# Patient Record
Sex: MALE | Race: WHITE | URBAN - METROPOLITAN AREA
[De-identification: names, ages, dates, MRNs, and addresses within clinical notes are randomized per-mention and may not be internally consistent; named-entity substitution may affect disease eponyms.]

---

## 2020-01-02 ENCOUNTER — OFFICE VISIT (OUTPATIENT)
Dept: URBAN - METROPOLITAN AREA CLINIC 25 | Facility: CLINIC | Age: 25
End: 2020-01-02
Payer: COMMERCIAL

## 2020-01-02 DIAGNOSIS — H52.13 MYOPIA, BILATERAL: Primary | ICD-10-CM

## 2020-01-02 PROCEDURE — 92014 COMPRE OPH EXAM EST PT 1/>: CPT | Performed by: OPTOMETRIST

## 2020-01-02 PROCEDURE — 92310 CONTACT LENS FITTING OU: CPT | Performed by: OPTOMETRIST

## 2020-01-02 ASSESSMENT — INTRAOCULAR PRESSURE
OD: 16
OS: 14

## 2020-01-02 ASSESSMENT — KERATOMETRY
OS: 42.37
OD: 42.19

## 2020-01-02 ASSESSMENT — VISUAL ACUITY
OD: 20/20
OS: 20/20

## 2022-06-24 ENCOUNTER — OFFICE VISIT (OUTPATIENT)
Dept: URBAN - METROPOLITAN AREA CLINIC 26 | Facility: CLINIC | Age: 27
End: 2022-06-24
Payer: COMMERCIAL

## 2022-06-24 DIAGNOSIS — H16.012 CENTRAL CORNEAL ULCER OF LEFT EYE: Primary | ICD-10-CM

## 2022-06-24 PROCEDURE — 99203 OFFICE O/P NEW LOW 30 MIN: CPT | Performed by: OPTOMETRIST

## 2022-06-24 ASSESSMENT — INTRAOCULAR PRESSURE
OD: 18
OS: 17

## 2022-06-24 NOTE — IMPRESSION/PLAN
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 Plan: current cl wearer. last worn 2 days ago. pt denies overnight wear. denies hx of herpetic infections/cold sores. suspect bacterial infection. start Ofloxacin q2h OS while awake. start erythromycin dang qhs OS. no cl wear for OS. rtc Monday for f/u with Dr. Hussein Olsen, sooner if NI or worsens.

## 2022-06-27 ENCOUNTER — OFFICE VISIT (OUTPATIENT)
Dept: URBAN - METROPOLITAN AREA CLINIC 26 | Facility: CLINIC | Age: 27
End: 2022-06-27
Payer: COMMERCIAL

## 2022-06-27 DIAGNOSIS — H16.012 CENTRAL CORNEAL ULCER OF LEFT EYE: Primary | ICD-10-CM

## 2022-06-27 PROCEDURE — 99213 OFFICE O/P EST LOW 20 MIN: CPT | Performed by: OPTOMETRIST

## 2022-06-27 RX ORDER — OFLOXACIN 3 MG/ML
0.3 % SOLUTION/ DROPS OPHTHALMIC
Qty: 0 | Refills: 0 | Status: ACTIVE
Start: 2022-06-27

## 2022-06-27 RX ORDER — PREDNISOLONE ACETATE 10 MG/ML
1 % SUSPENSION/ DROPS OPHTHALMIC
Qty: 5 | Refills: 1 | Status: ACTIVE
Start: 2022-06-27

## 2022-06-27 RX ORDER — ERYTHROMYCIN 5 MG/G
OINTMENT OPHTHALMIC
Qty: 0 | Refills: 0 | Status: ACTIVE
Start: 2022-06-27

## 2022-06-27 ASSESSMENT — INTRAOCULAR PRESSURE
OD: 18
OS: 21

## 2022-06-27 NOTE — IMPRESSION/PLAN
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 Plan: current cl wearer. pt denies overnight wear. denies hx of herpetic infections/cold sores. Decrease Ofloxacin to 1 gt QID. Discontinue  erythromycin dang qhs OS. Start Pred 1gt QID OS. no cl wear for OS. rtc in 2 days for f/u with Dr. Star Caballero, sooner if NI or worsens.

## 2022-06-29 ENCOUNTER — OFFICE VISIT (OUTPATIENT)
Dept: URBAN - METROPOLITAN AREA CLINIC 26 | Facility: CLINIC | Age: 27
End: 2022-06-29
Payer: COMMERCIAL

## 2022-06-29 PROCEDURE — 99213 OFFICE O/P EST LOW 20 MIN: CPT | Performed by: OPTOMETRIST

## 2022-06-29 ASSESSMENT — INTRAOCULAR PRESSURE
OD: 18
OS: 18

## 2022-06-29 NOTE — IMPRESSION/PLAN
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 Plan: current cl wearer. pt denies overnight wear. denies hx of herpetic infections/cold sores. Continue Ofloxacin to 1 gt QID for 2 days then discontinue. increase  Pred to1gt 6x/day x 3days, 5x /day for 1 week, 4x/day x1 week OS. no cl wear for OS. rtc in 2 weeks for f/u with Dr. Ann Parks, sooner if NI or worsens.

## 2022-07-12 ENCOUNTER — OFFICE VISIT (OUTPATIENT)
Dept: URBAN - METROPOLITAN AREA CLINIC 26 | Facility: CLINIC | Age: 27
End: 2022-07-12
Payer: COMMERCIAL

## 2022-07-12 DIAGNOSIS — H16.012 CENTRAL CORNEAL ULCER OF LEFT EYE: Primary | ICD-10-CM

## 2022-07-12 PROCEDURE — 99213 OFFICE O/P EST LOW 20 MIN: CPT | Performed by: OPTOMETRIST

## 2022-07-12 ASSESSMENT — INTRAOCULAR PRESSURE
OS: 15
OD: 16

## 2022-07-12 NOTE — IMPRESSION/PLAN
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 Plan: current cl wearer. pt denies overnight wear. denies hx of herpetic infections/cold sores. Patient discontinued  Ofloxacin. Taper Pred to1gt 4x/day x 4days, 3x /day for 4 days, 2x/day x4 days OS. Ok to resume Cl wear in 1 week. F/U 1 month with Dr. Grisel Lassiter, sooner if NI or worsens.